# Patient Record
Sex: FEMALE | Race: WHITE | URBAN - METROPOLITAN AREA
[De-identification: names, ages, dates, MRNs, and addresses within clinical notes are randomized per-mention and may not be internally consistent; named-entity substitution may affect disease eponyms.]

---

## 2023-11-28 ENCOUNTER — APPOINTMENT (OUTPATIENT)
Dept: AUDIOLOGY | Facility: CLINIC | Age: 83
End: 2023-11-28
Payer: MEDICARE

## 2024-07-05 PROBLEM — R06.02 SHORTNESS OF BREATH: Status: ACTIVE | Noted: 2022-01-07

## 2024-07-05 PROBLEM — R32 UNSPECIFIED URINARY INCONTINENCE: Status: ACTIVE | Noted: 2021-06-03

## 2024-07-05 PROBLEM — Z86.79 HISTORY OF HYPERTENSION: Status: ACTIVE | Noted: 2024-07-05

## 2024-07-05 PROBLEM — M19.011 OSTEOARTHRITIS OF RIGHT SHOULDER: Status: ACTIVE | Noted: 2019-10-15

## 2024-07-05 PROBLEM — H35.3131 EARLY STAGE NONEXUDATIVE AGE-RELATED MACULAR DEGENERATION OF BOTH EYES: Status: ACTIVE | Noted: 2019-07-18

## 2024-07-05 PROBLEM — H90.3 BILATERAL SENSORINEURAL HEARING LOSS: Status: ACTIVE | Noted: 2024-07-05

## 2024-07-05 PROBLEM — H26.493 PCO (POSTERIOR CAPSULAR OPACIFICATION), BILATERAL: Status: ACTIVE | Noted: 2019-07-18

## 2024-07-05 PROBLEM — I87.2 VENOUS INSUFFICIENCY OF LOWER EXTREMITY: Status: ACTIVE | Noted: 2019-10-15

## 2024-07-05 PROBLEM — M19.09 PRIMARY OSTEOARTHRITIS, OTHER SPECIFIED SITE: Status: ACTIVE | Noted: 2021-06-03

## 2024-07-05 PROBLEM — H52.223 REGULAR ASTIGMATISM OF BOTH EYES: Status: ACTIVE | Noted: 2020-10-09

## 2024-07-05 PROBLEM — H52.01 HYPEROPIA OF RIGHT EYE: Status: ACTIVE | Noted: 2020-10-09

## 2024-07-05 PROBLEM — T83.711A: Status: ACTIVE | Noted: 2020-03-05

## 2024-07-05 PROBLEM — H52.7 REFRACTIVE ERROR: Status: ACTIVE | Noted: 2019-07-18

## 2024-07-05 PROBLEM — N32.9 DISORDER OF BLADDER: Status: ACTIVE | Noted: 2024-07-05

## 2024-07-05 PROBLEM — Z96.1 PSEUDOPHAKIA OF BOTH EYES: Status: ACTIVE | Noted: 2019-07-18

## 2024-07-05 PROBLEM — M77.40 METATARSALGIA: Status: ACTIVE | Noted: 2021-08-03

## 2024-07-05 PROBLEM — N32.81 OAB (OVERACTIVE BLADDER): Status: ACTIVE | Noted: 2024-03-14

## 2024-07-05 PROBLEM — E78.2 MIXED HYPERLIPIDEMIA: Status: ACTIVE | Noted: 2022-03-25

## 2024-07-05 PROBLEM — H54.1132: Status: ACTIVE | Noted: 2020-10-09

## 2024-07-05 PROBLEM — H04.123 DRY EYE SYNDROME OF BILATERAL LACRIMAL GLANDS: Status: ACTIVE | Noted: 2019-07-18

## 2024-07-05 PROBLEM — Z98.890 HISTORY OF RADIAL KERATOTOMY: Status: ACTIVE | Noted: 2019-07-18

## 2024-07-05 PROBLEM — I25.10 CORONARY ARTERY DISEASE: Status: ACTIVE | Noted: 2022-06-17

## 2024-07-05 PROBLEM — R94.39 ABNORMAL STRESS ECHOCARDIOGRAM: Status: ACTIVE | Noted: 2022-01-07

## 2024-07-05 PROBLEM — I10 ESSENTIAL HYPERTENSION: Status: ACTIVE | Noted: 2021-06-03

## 2024-07-05 PROBLEM — I50.32 CHRONIC HEART FAILURE WITH PRESERVED EJECTION FRACTION (HFPEF) (MULTI): Status: ACTIVE | Noted: 2024-05-01

## 2024-07-05 PROBLEM — Z95.5 S/P CORONARY ARTERY STENT PLACEMENT: Status: ACTIVE | Noted: 2022-03-25

## 2024-07-05 PROBLEM — H52.12 MYOPIA OF LEFT EYE: Status: ACTIVE | Noted: 2020-10-09

## 2024-07-05 PROBLEM — B35.1 ONYCHOMYCOSIS: Status: ACTIVE | Noted: 2019-10-15

## 2024-07-05 PROBLEM — M20.10 ACQUIRED HALLUX VALGUS: Status: ACTIVE | Noted: 2019-10-15

## 2024-07-05 RX ORDER — ASPIRIN 81 MG/1
TABLET ORAL
COMMUNITY
Start: 2023-07-13

## 2024-07-05 RX ORDER — CARVEDILOL 3.12 MG/1
TABLET ORAL 2 TIMES DAILY
COMMUNITY
Start: 2023-04-20

## 2024-07-05 RX ORDER — MINERAL OIL
ENEMA (ML) RECTAL DAILY PRN
COMMUNITY

## 2024-07-05 RX ORDER — RABEPRAZOLE SODIUM 20 MG/1
TABLET, DELAYED RELEASE ORAL DAILY
COMMUNITY
Start: 2023-10-05

## 2024-07-05 RX ORDER — FUROSEMIDE 20 MG/1
1 TABLET ORAL DAILY
COMMUNITY
Start: 2024-01-03 | End: 2025-01-02

## 2024-07-05 RX ORDER — LIFITEGRAST 50 MG/ML
SOLUTION/ DROPS OPHTHALMIC EVERY 12 HOURS
COMMUNITY

## 2024-07-05 RX ORDER — LOSARTAN POTASSIUM AND HYDROCHLOROTHIAZIDE 25; 100 MG/1; MG/1
1 TABLET ORAL DAILY
COMMUNITY
Start: 2022-05-03

## 2024-07-05 RX ORDER — LUTEIN 10 MG
1 TABLET ORAL AS NEEDED
COMMUNITY

## 2024-07-05 RX ORDER — ROSUVASTATIN CALCIUM 40 MG/1
40 TABLET, COATED ORAL DAILY
COMMUNITY
Start: 2022-06-17

## 2024-07-05 RX ORDER — DEXTROMETHORPHAN HYDROBROMIDE, GUAIFENESIN 5; 100 MG/5ML; MG/5ML
650 LIQUID ORAL EVERY 8 HOURS PRN
COMMUNITY

## 2024-07-18 ENCOUNTER — APPOINTMENT (OUTPATIENT)
Dept: AUDIOLOGY | Facility: CLINIC | Age: 84
End: 2024-07-18
Payer: MEDICARE

## 2024-08-03 NOTE — PROGRESS NOTES
Subjective   Patient ID: Teresa Smith is a 84 y.o. female who presents for No chief complaint on file..  HPI  This 84-year-old female last seen in the office in July 2023 is being seen in follow-up on sensorineural hearing loss.  The degree of the hearing loss suggested benefit from hearing aid use and she decided to look into this after last year's visit.  There is been no description of tinnitus issues or problems of vertigo.  There is also been no recent respiratory infections or troubles with swallowing or with voice.  She did contract COVID in late March but did recover from that using Paxlovid.  She did have some rebound after stopping that medication and symptoms resolved after about 3 weeks.  Review of Systems  A 12 point ROS has been reviewed and are negative for complaint except what is stated in the history of present illness and/or past medical history as noted in the EMR    Active Ambulatory Problems     Diagnosis Date Noted    Abnormal stress echocardiogram 01/07/2022    Acquired hallux valgus 10/15/2019    Bilateral sensorineural hearing loss 07/05/2024    Blindness right eye category 3, low vision left eye category 2 10/09/2020    Chronic heart failure with preserved ejection fraction (HFpEF) (Multi) 05/01/2024    Closed fracture of nasal bone 06/01/2016    Coronary artery disease 06/17/2022    Disorder of bladder 07/05/2024    Dry eye syndrome of bilateral lacrimal glands 07/18/2019    Early stage nonexudative age-related macular degeneration of both eyes 07/18/2019    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 (CMS-[facility]) 03/05/2020    Essential hypertension 06/03/2021    History of hypertension 07/05/2024    History of radial keratotomy 07/18/2019    Hyperopia of right eye 10/09/2020    Metatarsalgia 08/03/2021    Mixed hyperlipidemia 03/25/2022    Myopia of left eye 10/09/2020    Nasal obstruction 07/25/2016    OAB (overactive bladder) 03/14/2024    Onychomycosis 10/15/2019     Osteoarthritis of right shoulder 10/15/2019    PCO (posterior capsular opacification), bilateral 07/18/2019    Primary osteoarthritis, other specified site 06/03/2021    Pseudophakia of both eyes 07/18/2019    Refractive error 07/18/2019    Regular astigmatism of both eyes 10/09/2020    S/P coronary artery stent placement 03/25/2022    Shortness of breath 01/07/2022    Unspecified urinary incontinence 06/03/2021    Venous insufficiency of lower extremity 10/15/2019     Resolved Ambulatory Problems     Diagnosis Date Noted    No Resolved Ambulatory Problems     Past Medical History:   Diagnosis Date    Bladder disorder, unspecified     Personal history of other diseases of the circulatory system     Personal history of other diseases of the musculoskeletal system and connective tissue     Personal history of other drug therapy          Current Outpatient Medications:     acetaminophen (Tylenol 8 HOUR) 650 mg ER tablet, Take 1 tablet (650 mg) by mouth every 8 hours if needed for mild pain (1 - 3)., Disp: , Rfl:     aspirin 81 mg EC tablet, Take by mouth., Disp: , Rfl:     carvedilol (Coreg) 3.125 mg tablet, Take by mouth 2 times a day., Disp: , Rfl:     estrogens, conjugated, (Premarin) vaginal cream, Insert 0.5 g into the vagina 2 times a week., Disp: , Rfl:     fexofenadine (Allegra) 180 mg tablet, once daily as needed., Disp: , Rfl:     furosemide (Lasix) 20 mg tablet, Take 1 tablet (20 mg) by mouth once daily., Disp: , Rfl:     lifitegrast (Xiidra) 5 % dropperette, every 12 hours., Disp: , Rfl:     losartan-hydrochlorothiazide (Hyzaar) 100-25 mg tablet, Take 1 tablet by mouth once daily., Disp: , Rfl:     lutein 10 mg tablet, Take 1 tablet by mouth if needed., Disp: , Rfl:     RABEprazole (Aciphex) EC tablet, Take by mouth once daily., Disp: , Rfl:     rosuvastatin (Crestor) 40 mg tablet, Take 1 tablet (40 mg) by mouth once daily., Disp: , Rfl:    Social History     Tobacco Use    Smoking status: Not on file     Smokeless tobacco: Not on file   Substance Use Topics    Alcohol use: Not on file      Allergies   Allergen Reactions    Penicillins Hives and Itching    Codeine Nausea And Vomiting and Nausea/vomiting    Cortisone Unknown       There were no vitals taken for this visit.    Objective   Physical Exam  EXAMINATION:     GENERAL FABBY.EARANCE: Alert, in no acute distress, normal pitch and clarity of voice, well-developed and nourished, cooperative.     HEAD/FACE: Normocephalic, atraumatic, normal facial movements and strength, no no tenderness to palpation, no lesions noted.     SKIN: Normal turgor, no raised or ulcerative lesions, warm and dry to palpation.     EYES: Extraocular motions intact, no nystagmus noted, pupils equal and reactive to light and accommodation, no conjunctivitis.     EARS: Both ears--external ear anatomy is normal without lesions, auditory canals are patent and without skin abrasions or lesions, hearing is intact to voice, moderate cerumen was noted and removed otoscopically, tympanic membranes are intact with no acute inflammation, light reflexes present, no effusions are noted and no mastoid tenderness found to palpation.     NOSE: No external skin lesions are noted, nares are patent, septum is intact, sinuses are nontender to palpation bilaterally, no intranasal lesions or inflammation is noted, nasal valve is normal.     OROPHARYNX/ORAL CAVITY: Oropharynx is not inflamed and is without lesions, mucosa of the oral cavity is intact and without lesions, tongue is midline and mobile, no acute dental disease is noted, TMJs are mobile     NECK: No lymphadenopathy is palpated, carotid pulses are intact, neck is supple with full range of motion, no thyroid abnormalities are noted, trachea is midline, no neck masses are palpated.     LYMPHATICS: No cervical adenopathy or supraclavicular adenopathy is palpated.     NEUROLOGIC/PSYCH; alert and oriented, cranial nerves are grossly intact, gait is without  falling, no motor deficits are noted.     Patient ID: Teresa Smith is a 84 y.o. female.    Ear cerumen removal    Date/Time: 8/5/2024 11:10 AM    Performed by: Henri Jaramillo DMD, MD  Authorized by: Henri Jaramillo DMD, MD    Consent:     Consent obtained:  Verbal    Consent given by:  Patient    Risks discussed:  Pain and incomplete removal    Alternatives discussed:  No treatment and alternative treatment  Universal protocol:     Procedure explained and questions answered to patient or proxy's satisfaction: yes      Imaging studies available: no      Required blood products, implants, devices, and special equipment available: no      Patient identity confirmed:  Verbally with patient  Procedure details:     Location:  L ear and R ear    Procedure type: curette      Procedure type comment:  Or suction    Procedure outcomes: cerumen removed    Post-procedure details:     Inspection:  No bleeding and ear canal clear    Hearing quality:  Improved    Procedure completion:  Tolerated well, no immediate complications  Comments:        Procedure Ear Cleaning    Consent:  The planned procedure including the risks as well as alternatives of treatments were discussed and verbal consent obtained.    Procedure: Using otoscopic techniques, cerumen is removed with a wax loop from both ear canals.    Findings:  The external auditory canals are without inflammation or lesions and both tympanic members are normal in appearance with no evidence for middle ear effusion or signs of infection. No mastoid tenderness is noted. The patient tolerated the procedure well.         Her audiogram today on the left-hand side revealed low normal hearing up to 500 Hz then a mild to severe sloping sensory hearing loss is noted stable from past testing.  On the right-hand side there is a moderate low-frequency loss in the 2 low frequencies of sound mild at 501,000 Hz moderate to severe in the remaining frequencies.  The asymmetry in the low tones  remains right lower than left.  Discrimination scores remain 100% in both ears of 70 dB still intact tympanograms with no evidence for middle ear fluid.  SRT levels are 30 dB.  Assessment/Plan   Problem List Items Addressed This Visit             ICD-10-CM    Bilateral sensorineural hearing loss - Primary H90.3    Blindness right eye category 3, low vision left eye category 2 H54.1132    Nasal obstruction J34.89     I discussed the present hearing test findings with the patient. Since the last test there has been no significant change in the hearing of individual frequencies sound. Discrimination ability remains basically unchanged. It would be advised that a yearly audiogram be done unless symptoms develop in regards to progressive loss, new onset vertigo, or changes regarding tinnitus. Avoidance of loud noise without ear protection is advised. Rehabilitation using hearing aids is advised.  She has any difficulties with vertigo, ear pain, sudden hearing change or upper respiratory tract infections that are nonclearing she should contact the office.    This patient is advised to follow up with their PCP for all other health care issues and treatment. Dictation was done with dragon transcription and errors in spelling  and diction are possible.    Henri Jaramillo DMD, MD 07/16/24 1:49 PM

## 2024-08-05 ENCOUNTER — APPOINTMENT (OUTPATIENT)
Dept: AUDIOLOGY | Facility: CLINIC | Age: 84
End: 2024-08-05
Payer: MEDICARE

## 2024-08-05 ENCOUNTER — APPOINTMENT (OUTPATIENT)
Dept: OTOLARYNGOLOGY | Facility: CLINIC | Age: 84
End: 2024-08-05
Payer: MEDICARE

## 2024-08-05 VITALS — WEIGHT: 140 LBS | BODY MASS INDEX: 25.76 KG/M2 | HEIGHT: 62 IN

## 2024-08-05 DIAGNOSIS — H93.13 TINNITUS OF BOTH EARS: ICD-10-CM

## 2024-08-05 DIAGNOSIS — J34.89 NASAL OBSTRUCTION: ICD-10-CM

## 2024-08-05 DIAGNOSIS — H90.3 SENSORINEURAL HEARING LOSS (SNHL) OF BOTH EARS: Primary | ICD-10-CM

## 2024-08-05 DIAGNOSIS — H90.3 BILATERAL SENSORINEURAL HEARING LOSS: Primary | ICD-10-CM

## 2024-08-05 DIAGNOSIS — H61.23 BILATERAL IMPACTED CERUMEN: ICD-10-CM

## 2024-08-05 DIAGNOSIS — H54.1132: ICD-10-CM

## 2024-08-05 PROCEDURE — 1159F MED LIST DOCD IN RCRD: CPT | Performed by: OTOLARYNGOLOGY

## 2024-08-05 PROCEDURE — 1036F TOBACCO NON-USER: CPT | Performed by: OTOLARYNGOLOGY

## 2024-08-05 PROCEDURE — 69210 REMOVE IMPACTED EAR WAX UNI: CPT | Performed by: OTOLARYNGOLOGY

## 2024-08-05 PROCEDURE — 99214 OFFICE O/P EST MOD 30 MIN: CPT | Performed by: OTOLARYNGOLOGY

## 2024-08-05 PROCEDURE — 92567 TYMPANOMETRY: CPT | Performed by: AUDIOLOGIST

## 2024-08-05 PROCEDURE — 92557 COMPREHENSIVE HEARING TEST: CPT | Performed by: AUDIOLOGIST

## 2024-08-05 PROCEDURE — 1160F RVW MEDS BY RX/DR IN RCRD: CPT | Performed by: OTOLARYNGOLOGY

## 2024-08-05 RX ORDER — LANSOPRAZOLE 30 MG/1
30 CAPSULE, DELAYED RELEASE ORAL
COMMUNITY
Start: 2024-04-02

## 2024-08-05 RX ORDER — HYDROCHLOROTHIAZIDE 25 MG/1
1 TABLET ORAL EVERY MORNING
COMMUNITY

## 2024-08-05 RX ORDER — KETOCONAZOLE 20 MG/ML
SHAMPOO, SUSPENSION TOPICAL
COMMUNITY
Start: 2024-07-27

## 2024-08-05 NOTE — PROGRESS NOTES
COMPREHENSIVE AUDIOMETRIC EVALUATION      Name:  Teresa Smith  :  1940  Age:  84 y.o.  Date of Evaluation:  24   Referring Provider:  Dr. Jaramillo     History:  Ms. Smith was seen today for an evaluation of hearing.  Please recall, patient has a known sensorineural hearing loss, bilaterally, for which she utilizes hearing aids. Patient reported tinnitus, bilaterally, in the mornings though noted it is non-bothersome.  When asked, patient denied otalgia, aural fullness, and concerns for decreased hearing sensitivity    See audiometric evaluation at end of this report or scanned under media tab    OTOSCOPY:       Right Ear: Significant though non-occluding cerumen       Left Ear: Significant though non-occluding cerumen    226 Hz TYMPANOMETRY:       Right Ear: Type A: normal peak pressure, compliance, and ear canal volume, consistent with middle ear function within normal limits       Left Ear: Type Ad: hypercompliant with normal peak pressure and ear canal volume    AUDIOMETRIC EVALUATION (Phones):       Right Ear: Mild sloping to Profound Sensorineural hearing loss                 Left Ear: Normal sloping to Profound Sensorineural hearing loss           NOTE: Hearing sensitivity within test retest reliability compared to most recent audiometric evaluation 2023    Test technique:  Standard Audiometry  Reliability:   good    SPEECH RECOGNITION THRESHOLD:       Right Ear:  30 dBHL in good agreement with PTA       Left Ear:  30 dBHL in good agreement with PTA    WORD RECOGNITION:       Right Ear:  excellent (100%) at elevated presentation level       Left Ear:  excellent (100%) at elevated presentation level    DISCUSSION:   Discussed results and recommendations with patient.  Questions were addressed and the patient was encouraged to contact our department should concerns arise.    RECOMMENDATIONS:  -Recommend patient return should concerns for changes in hearing sensitivity arise or as medically  indicated.   -Recommend patient continue consistent utilization of hearing aids    Hernandez Batres, CCC-A     Appt: 10:30 - 11:00 AM

## 2025-08-11 ENCOUNTER — APPOINTMENT (OUTPATIENT)
Dept: AUDIOLOGY | Facility: CLINIC | Age: 85
End: 2025-08-11
Payer: MEDICARE

## 2025-08-11 ENCOUNTER — APPOINTMENT (OUTPATIENT)
Dept: OTOLARYNGOLOGY | Facility: CLINIC | Age: 85
End: 2025-08-11
Payer: MEDICARE

## 2025-09-30 ENCOUNTER — APPOINTMENT (OUTPATIENT)
Dept: OTOLARYNGOLOGY | Facility: CLINIC | Age: 85
End: 2025-09-30
Payer: MEDICARE

## 2025-09-30 ENCOUNTER — APPOINTMENT (OUTPATIENT)
Dept: AUDIOLOGY | Facility: CLINIC | Age: 85
End: 2025-09-30
Payer: MEDICARE